# Patient Record
Sex: MALE | Race: WHITE | Employment: FULL TIME | URBAN - METROPOLITAN AREA
[De-identification: names, ages, dates, MRNs, and addresses within clinical notes are randomized per-mention and may not be internally consistent; named-entity substitution may affect disease eponyms.]

---

## 2024-08-19 SDOH — HEALTH STABILITY: PHYSICAL HEALTH: ON AVERAGE, HOW MANY MINUTES DO YOU ENGAGE IN EXERCISE AT THIS LEVEL?: 120 MIN

## 2024-08-19 SDOH — HEALTH STABILITY: PHYSICAL HEALTH: ON AVERAGE, HOW MANY DAYS PER WEEK DO YOU ENGAGE IN MODERATE TO STRENUOUS EXERCISE (LIKE A BRISK WALK)?: 5 DAYS

## 2024-08-21 ENCOUNTER — OFFICE VISIT (OUTPATIENT)
Dept: ORTHOPEDIC SURGERY | Age: 54
End: 2024-08-21

## 2024-08-21 VITALS — HEIGHT: 70 IN | BODY MASS INDEX: 25.91 KG/M2 | WEIGHT: 181 LBS

## 2024-08-21 DIAGNOSIS — M25.562 LEFT KNEE PAIN, UNSPECIFIED CHRONICITY: Primary | ICD-10-CM

## 2024-08-21 PROCEDURE — 99203 OFFICE O/P NEW LOW 30 MIN: CPT | Performed by: STUDENT IN AN ORGANIZED HEALTH CARE EDUCATION/TRAINING PROGRAM

## 2024-08-21 NOTE — PROGRESS NOTES
Exam  Extremity:    LLE: Skin intact.  Mild tenderness palpation about medial joint line.  Bony crepitus felt with Kyra's in the medial and lateral joint spaces, crepitus felt in the patella femoral compartment with mobilization of the patella.  Mild tenderness palpation under medial patellar facet.  Negative patellar grind test.  Full range of motion.  Knee does have small amount of laxity with valgus stress testing at 30 degrees of flexion compared to the contralateral side, but is nontender to palpation about the medial femoral condyle, or the medial tibial plateau.  Lachman 2A.  Negative anterior negative posterior drawer.  Sensation intact to light touch about the knee.  Extremities warm well-perfused.  Normal gait.    Radiology: No results found.    History: Left knee pain    Comparison: None    Findings: 4 views of the left knee (AP, lateral, notch, sunrise) in a skeletally mature individual demonstrating bone-on-bone osteoarthritic changes in the lateral joint compartment with subchondral sclerosis and osteophyte formation.  There is also osteophyte formation and some joint space narrowing in the patellofemoral compartment.  Joint spaces still present in the medial compartment.    Impression: Advanced lateral compartment osteoarthritic changes, osteoarthritic changes in the patellofemoral and medial joint space.    ASSESSMENT:     1. Left knee pain, unspecified chronicity         PLAN:  Assessment & Plan     -New images were taken today in the clinic.  Images were reviewed and discussed with the patient.  On evaluation, he does have some pinpoint tenderness in the medial joint space, as well as symptoms of popping while ambulating on the stairs.  We did discuss that he could possibly have a meniscal tear, as well as pain from his bone-on-bone osteoarthritic changes despite not having many symptoms in his lateral compartment.  -We did discuss different conservative treatment options at this time including

## 2024-08-27 ENCOUNTER — HOSPITAL ENCOUNTER (OUTPATIENT)
Dept: PHYSICAL THERAPY | Age: 54
Discharge: HOME | End: 2024-08-27

## 2024-08-27 PROCEDURE — 9900000067 HC THERAPEUTIC EXERCISE EA 15 MINS (SELF-PAY)

## 2024-08-27 PROCEDURE — 9900000066 HC EVALUATION (SELF-PAY)

## 2024-08-27 NOTE — CONSULTS
[x] [facility]  Outpatient Rehabilitation &  Therapy  [address]  [phone]  [phone]              Physical Therapy Lower Extremity Evaluation    Date:  2024  Patient: Francisco Fleming  : 1970  MRN: [id number]  Physician: Colin Bazzi   Insurance: self pay  Medical Diagnosis: Left knee pain, unspecified chronicity (M25.562)     Rehab Codes: M 25.562, M 62.81, R 26.89, M 25.462  Onset date: 24  Next Dr's appt.: 10/2/24    Subjective:   HPI/CC: Patient is 54 y.o. male presenting to PT with his wife (Thania) after recent increase in L knee pain. Onset began after helping with helping someone move in 2024. He went up/down stairs a lot and up/down ramps and remembers it was \"really sore\" afterwards. A few weeks later, he noticed that his L knee began to click (believes the clicking is isolated to the medial side of his knee).     Clicking only happens when he is on stairs, going up moreso than down. To manage his pain, he takes Alleve ~every 18 hours. Has only tried icing once and is unsure if it helped. States that he becomes anxious when making lateral movements or if he were to make athletic agility moves. He feels that he is on the brink of something tearing. It does not bother him at work since there are no stairs to climb.      In the past he was given a large fitted titanium brace to wear for anything athletic, but he is unsure if still fits now (his legs are smaller).     He has a history of ACL tear in the L knee in which he received a scope, but no surgical repair ().       PMHx: [x] Unremarkable [] Diabetes [] HTN  [] Pacemaker   [] MI/Heart Problems [] Cancer [] Arthritis [] Other:              [] Refer to full medical chart  In [facility]     No past medical history on file.   No past surgical history on file.    Surgical Hx: L ACL scope -       Comorbidities:   [] Obesity [] Dialysis  [x] N/A   [] Asthma/COPD [] Dementia [] Other:   [] Stroke []

## 2024-08-29 ENCOUNTER — HOSPITAL ENCOUNTER (OUTPATIENT)
Dept: PHYSICAL THERAPY | Age: 54
Discharge: HOME | End: 2024-08-29

## 2024-08-29 PROCEDURE — 97016 VASOPNEUMATIC DEVICE THERAPY: CPT

## 2024-08-29 PROCEDURE — 9900000067 HC THERAPEUTIC EXERCISE EA 15 MINS (SELF-PAY)

## 2024-08-29 PROCEDURE — 9900000069 HC VASOPNEUMATIC DEVICE THERAPY (SELF-PAY)

## 2024-08-29 PROCEDURE — 97110 THERAPEUTIC EXERCISES: CPT

## 2024-08-29 NOTE — FLOWSHEET NOTE
[]  Ther Activities     []  Neuro Re-ed     [x]  Vasocompression 15 1   [] Gait     []  Other     Total Billable time 50 2       Assessment: [x] Progressing toward goals.Patient denied increase in pain. No incidence of clicking during intro to new exercises. Vasocompression added to assist with swelling and provide management to potential soreness following exercise session.    [] No change.     [] Other:  [x] Patient would continue to benefit from skilled physical therapy services in order to: decrease pain, increase strength, improve function, reduce effusion, normalize gait & stair negotiation    STG: (to be met in 4 treatments)     Patient will demonstrate safe biomechanics for body weight squats with 0 cues needed to reduce likelihood or reoccurrence of pain and inflammation.  Patient to be independent with home exercise program with no need for cuing of effective exercise technique.  Patient will perform full knee flexion ROM without complaint of end range pain.   Patient's L knee girth will decrease from 42.5 cm 40 cm to be WNL compared to the uninvolved side.      LTG: (to be met in 10 treatments)  Patient will safely transition to use of HEP with 100% carryover to independent home use.   Patient will increase knee flexion and extension strength to 5/5 to improve hamstring activation and endurance when performing ADLs (i.e. lifting, stairs, squatting).  ? Function: Patient will improve LEFS from 14% loss of function to <5% loss of function to show increased tolerance to ADLs      Pt. Education:  [] Yes  [] No  [x] Reviewed Prior HEP/Ed  Method of Education: [x] Verbal  [x] Demo  [] Written  Comprehension of Education:  [x] Verbalizes understanding.  [x] Demonstrates understanding.  [] Needs review.  [x] Demonstrates/verbalizes HEP/Ed previously given.     Access Code: [email or web address]  URL: [email or web address]  Date: 08/27/2024  Prepared by: Brittney Bassett     Exercises  - Seated Long Arc Quad with Hip  Adduction  - 2 x daily - 7 x weekly - 10 reps - 5 sec hold  - Active Straight Leg Raise with Quad Set  - 2 x daily - 7 x weekly - 10 reps  - Supine Bridge with Mini Swiss Ball Between Knees  - 2 x daily - 7 x weekly - 10 reps      Plan: [x] Continue current frequency toward long and short term goals.    [x] Specific Instructions for subsequent treatments: glute strengthening, HS strengthening, functional movement body mechanics (specifically body weight squat), plan to assess PF and tibiofemoral joint mobs next session, VMO focused strength training       Time In: 8:00 am            Time Out: 8:55 am    Electronically signed by:  Kori Leach, RAJAN     Patient treated and note written by Kori Leach, Student PT under direct supervision of Brittney Bassett, ROSIE.

## 2024-09-10 ENCOUNTER — HOSPITAL ENCOUNTER (OUTPATIENT)
Dept: PHYSICAL THERAPY | Age: 54
Discharge: HOME | End: 2024-09-10

## 2024-09-10 PROCEDURE — 9900000067 HC THERAPEUTIC EXERCISE EA 15 MINS (SELF-PAY)

## 2024-09-10 PROCEDURE — 97110 THERAPEUTIC EXERCISES: CPT

## 2024-09-10 PROCEDURE — 9900000069 HC VASOPNEUMATIC DEVICE THERAPY (SELF-PAY)

## 2024-09-12 ENCOUNTER — TELEPHONE (OUTPATIENT)
Dept: ORTHOPEDIC SURGERY | Age: 54
End: 2024-09-12

## 2024-09-12 ENCOUNTER — HOSPITAL ENCOUNTER (OUTPATIENT)
Dept: PHYSICAL THERAPY | Age: 54
Discharge: HOME | End: 2024-09-12

## 2024-09-12 PROCEDURE — 9900000067 HC THERAPEUTIC EXERCISE EA 15 MINS (SELF-PAY)

## 2024-09-12 PROCEDURE — 9900000069 HC VASOPNEUMATIC DEVICE THERAPY (SELF-PAY)

## 2024-09-17 ENCOUNTER — HOSPITAL ENCOUNTER (OUTPATIENT)
Dept: PHYSICAL THERAPY | Age: 54
Discharge: HOME | End: 2024-09-17

## 2024-09-17 PROCEDURE — 9900000069 HC VASOPNEUMATIC DEVICE THERAPY (SELF-PAY)

## 2024-09-17 PROCEDURE — 9900000067 HC THERAPEUTIC EXERCISE EA 15 MINS (SELF-PAY)

## 2024-09-17 PROCEDURE — 9900000074 HC THERAPEUTIC ACTIVITIES PER 15 MIN (SELF-PAY)

## 2024-09-19 ENCOUNTER — HOSPITAL ENCOUNTER (OUTPATIENT)
Dept: PHYSICAL THERAPY | Age: 54
Discharge: HOME | End: 2024-09-19

## 2024-09-19 PROCEDURE — 9900000069 HC VASOPNEUMATIC DEVICE THERAPY (SELF-PAY)

## 2024-09-19 PROCEDURE — 9900000067 HC THERAPEUTIC EXERCISE EA 15 MINS (SELF-PAY)

## 2024-09-24 ENCOUNTER — HOSPITAL ENCOUNTER (OUTPATIENT)
Dept: PHYSICAL THERAPY | Age: 54
Discharge: HOME | End: 2024-09-24

## 2024-09-24 PROCEDURE — 9900000069 HC VASOPNEUMATIC DEVICE THERAPY (SELF-PAY)

## 2024-09-24 PROCEDURE — 9900000067 HC THERAPEUTIC EXERCISE EA 15 MINS (SELF-PAY)

## 2024-09-26 ENCOUNTER — HOSPITAL ENCOUNTER (OUTPATIENT)
Dept: PHYSICAL THERAPY | Age: 54
Discharge: HOME | End: 2024-09-26

## 2024-09-26 PROCEDURE — 9900000067 HC THERAPEUTIC EXERCISE EA 15 MINS (SELF-PAY)

## 2024-09-26 PROCEDURE — 9900000069 HC VASOPNEUMATIC DEVICE THERAPY (SELF-PAY)

## 2024-10-02 ENCOUNTER — OFFICE VISIT (OUTPATIENT)
Dept: ORTHOPEDIC SURGERY | Age: 54
End: 2024-10-02

## 2024-10-02 ENCOUNTER — HOSPITAL ENCOUNTER (OUTPATIENT)
Dept: PHYSICAL THERAPY | Age: 54
Discharge: HOME | End: 2024-10-02

## 2024-10-02 VITALS — BODY MASS INDEX: 25.97 KG/M2 | WEIGHT: 181 LBS

## 2024-10-02 DIAGNOSIS — M25.562 LEFT KNEE PAIN, UNSPECIFIED CHRONICITY: Primary | ICD-10-CM

## 2024-10-02 PROCEDURE — 9900000067 HC THERAPEUTIC EXERCISE EA 15 MINS (SELF-PAY)

## 2024-10-02 PROCEDURE — 9900000069 HC VASOPNEUMATIC DEVICE THERAPY (SELF-PAY)

## 2024-10-02 PROCEDURE — 99213 OFFICE O/P EST LOW 20 MIN: CPT | Performed by: ORTHOPAEDIC SURGERY

## 2024-10-02 NOTE — PROGRESS NOTES
10/2 Pt to continue independently at home and in the gym with his HEP.   Pt to contact clinic to schedule if having any issues.

## 2024-10-02 NOTE — PROGRESS NOTES
[facility]  [address]  [place] [address]  Dept: [phone]  Dept Fax: [phone]        Ambulatory Follow Up    Subjective:       HPI:    Francisco Fleming is a 54 y.o. year old male who presents to our office today for routine follow-up regarding left knee pain.  Patient was previously seen in clinic on 8/21/2024 and at that time we did discuss the possibility of him having a meniscal tear as well as arthritic pain.  Patient was referred to physical therapy which she has been participating in for the past 6 weeks.  Patient states that his left knee pain has improved significantly.  He used to have pain on stairs on a daily basis associated with clicking, that was a 5 or 6 out of 10.  He states that his pain is now a 1-2 out of 10, and occurs occasionally on an every day basis.  He denies any feelings of instability.  Denies any new injuries.  Denies any numbness or tingling.      Review of Systems:  Constitutional: Negative for fever and chills.   HENT: Negative for congestion.    Eyes: Negative for blurred vision and double vision.   Respiratory: Negative for cough, shortness of breath and wheezing.    Cardiovascular: Negative for chest pain and palpitations.   Gastrointestinal: Negative for nausea. Negative for vomiting.   Musculoskeletal: Per HPI.   Skin: Negative for itching and rash.   Neurological: Negative for dizziness, sensory change and headaches.   Psychiatric/Behavioral: Negative for depression and suicidal ideas.       Objective :   General: AAOx3, NAD, appears stated age  CV: no obvious JVD, distal pulses 2+  Respiratory: chest rise symmetric, unlabored respirations, no audible wheezing  Skin: warm, well perfused, no obvious rashes or lesions  Psych: Patient displays understanding of exam, diagnosis, and plan.    MSK:   LLE: Skin intact.  Compartments soft and compressible.  Full active and passive range of motion.

## 2024-10-02 NOTE — FLOWSHEET NOTE
[x] [facility]  Outpatient Rehabilitation &  Therapy  [address]  [phone]  [phone]              Physical Therapy Daily Treatment Note    Date:  10/2/2024  Patient Name:  Francisco Fleming    :  1970  MRN: [id number]  Physician: Colin Bazzi                                   Insurance: self pay  Medical Diagnosis: Left knee pain, unspecified chronicity (M25.562)                      Rehab Codes: M 25.562, M 62.81, R 26.89, M 25.462  Onset date: 24                 Next Dr's appt.: 10/2/24  Visit# / total visits: 9/10    Cancels/No Shows: 0/0    Subjective:    Pain:  [] Yes  [x] No Location: L knee Pain Rating: (0-10 scale) 0/10  Pain altered Tx:  [x] No  [] Yes  Action:  Comments: Patient just saw his ortho doctor this morning and had all good news. Says that he does not need a follow up unless symptoms return (in which they would recommend an MRI). No concerns noted. Is confident in his ability to continue independently at home and in the gym with his HEP.    Objective:  Modalities: Game ready, medium pressure, 34°, supine w/wedge at end of Rx  Precautions:  Exercises:  Exercise Reps/ Time Weight/ Level Comments 10/2/2024    Warmup: Upright Bike 5min Strength 4     Retro Treadmill 5 min 2.2 mph Subjective and discharge planning discussed while patient was warming up x   Seated          LAQ 10x 5 sec 2 lbs Ball btwn knees    Supine           SLR  10x        SLR + ER 15x   For increased facilitation of medial thigh musculature, lag with this version    Hip bridge + add socorro x10  5sec  W/ ball between knees, focus on glut set next Rx    SL bridge + opp LE ext x10 B  W/ ball between knees    SB bridge + HS curl 2x10  Big red ball, added reps    Clamshells  2x10 grey            Standing/Functional       Monster walks 3x 24ft grey Fwd/bwd, denies feelings of instability today    SL leg press 2x15 20 lb 1 out of 30 reps had a click    Resisted side stepping 3x24ft grey loop

## 2024-10-09 NOTE — PROGRESS NOTES
I performed a history and physical examination of the patient and discussed management with the resident. I reviewed the /resident physician note and agree with the documented findings and plan of care. Any areas of disagreement are noted on the chart.   I have personally evaluated this patient and have completed at least one if not all key elements of the E/M (history, physical exam,procedure and MDM).  Additional findings are as noted. I agree with the chief complaint, past medical history, past surgical history, allergies, medications, social and family history as documented unless otherwise noted below.     Electronically signed by MICHAEL GERARD DO on 10/9/2024 at 8:01 AM